# Patient Record
Sex: MALE | Race: BLACK OR AFRICAN AMERICAN | NOT HISPANIC OR LATINO | Employment: FULL TIME | URBAN - METROPOLITAN AREA
[De-identification: names, ages, dates, MRNs, and addresses within clinical notes are randomized per-mention and may not be internally consistent; named-entity substitution may affect disease eponyms.]

---

## 2017-06-07 ENCOUNTER — OFFICE VISIT (OUTPATIENT)
Dept: INTERNAL MEDICINE | Facility: CLINIC | Age: 32
End: 2017-06-07
Payer: COMMERCIAL

## 2017-06-07 VITALS
TEMPERATURE: 97 F | HEIGHT: 72 IN | HEART RATE: 92 BPM | BODY MASS INDEX: 26.82 KG/M2 | WEIGHT: 198 LBS | DIASTOLIC BLOOD PRESSURE: 82 MMHG | SYSTOLIC BLOOD PRESSURE: 132 MMHG

## 2017-06-07 DIAGNOSIS — J00 HEAD COLD: Primary | ICD-10-CM

## 2017-06-07 DIAGNOSIS — B30.9 ACUTE VIRAL CONJUNCTIVITIS OF BOTH EYES: ICD-10-CM

## 2017-06-07 PROCEDURE — 99203 OFFICE O/P NEW LOW 30 MIN: CPT | Mod: S$GLB,,, | Performed by: FAMILY MEDICINE

## 2017-06-07 PROCEDURE — 99999 PR PBB SHADOW E&M-NEW PATIENT-LVL III: CPT | Mod: PBBFAC,,, | Performed by: FAMILY MEDICINE

## 2017-06-07 RX ORDER — PREDNISONE 5 MG/1
TABLET ORAL
Qty: 18 TABLET | Refills: 0 | Status: SHIPPED | OUTPATIENT
Start: 2017-06-07 | End: 2017-12-20

## 2017-06-07 RX ORDER — CETIRIZINE HYDROCHLORIDE 10 MG/1
10 TABLET ORAL DAILY
COMMUNITY
End: 2017-12-20

## 2017-06-07 NOTE — PATIENT INSTRUCTIONS
Conjunctivitis, Viral    Viral conjunctivitis (sometimes called pink eye) is a common infection of the eye. It is very contagious. Touching the infected eye, then touching another person passes this infection. It can also be spread from one eye to the other in this same way. The most common symptoms include redness, discharge from the eye, swollen eyelids, and a gritty or scratchy feeling in the eye.  This condition will take about 7 to 10 days to go away. Artificial tears (available without a prescription) are often recommended to moisten and clean the eyes. Antibiotic eye drops often are not recommended because they will not kill the virus. But sometimes they may be prescribed by eye doctors. This is to prevent a second, bacterial infection.  Home care  · Apply a towel soaked in cool water to the affected eye 3 to 4 times a day (just before applying medicine to the eye).  · It is common to have mucus drainage during the night, causing the eyelids to become crusted by morning. Use a warm, wet cloth to wipe this away.  · Launder cloths used to clean the eye after one use. Do not reuse them.  · If antibiotic medicines are prescribed, take them exactly as directed. Do not stop taking them until you are told to.  · You may use acetaminophen or ibuprofen to control pain, unless another medicine was prescribed. (Note:If you have chronic liver or kidney disease, or if you have ever had a stomach ulcer or gastrointestinal bleeding, talk with your healthcare provider before using these medicines.) Aspirin should never be used in anyone under 18 years of age who is ill with a fever. It may cause severe liver damage.  · Wash your hands before and after touching the affected eye. This helps to prevent spreading the infection to your other eye and to others.  · The infected person should avoid sharing towels, washcloths, and bedding with others. This is to prevent spreading the infection.  · This illness is contagious during  the first week. Children with this illness should be kept out of day care and school until the redness clears.  Follow-up care  Follow up with your healthcare provider, or as advised.  When to seek medical advice  Call your healthcare provider right away if any of these occur:  · Worsening vision  · Increasing pain in the eye  · Increasing swelling or redness of the eyelid  · Redness spreading to the face around the eye  · Large amount of green or yellow drainage from the eye  · Severe itching in or around the eye  · Fever of 100.4°F (38°C) or higher  Date Last Reviewed: 6/15/2015  © 4185-1304 [employer]. [address] [place] [address], [place] [address]. All rights reserved. This information is not intended as a substitute for professional medical care. Always follow your healthcare professional's instructions.        Understanding the Cold Virus  Colds are the most common illness that people get. Most adults get 2 or 3 colds per year, and most children get 5 to 7 colds per year. Colds may be caused by over 200 types of viruses. The most common of these are rhinoviruses (rhino refers to the nose).  What causes a cold virus?  All colds start with infection by a virus. You can be infected by more than one cold virus at a time. Infection with cold viruses happens when:  · You breathe in a virus from the air. This can happen when someone with a cold sneezes or coughs near you.  · You touch your eyes, nose, or mouth when your hand has a cold virus on it. This can happen if you touch an object that has the cold virus on it.  What are the symptoms of a cold virus?  Almost all colds involve a stuffy nose. Other common symptoms include:  · Runny nose  · Sneezing  · Sore throat  · Headache  · Cough  How is a cold treated?  Colds usually last 5 to 10 days. Treatment focuses on relieving symptoms. Treatments may include:  · Decongestant medicines. Several types of decongestants are available without prescription.  These may help reduce stuffy or runny nose symptoms.  · Prescription or over-the-counter nasal sprays. These may help reduce nasal symptoms, including stuffiness.  · Prescription or over-the-counter pain medicines. These can help with headaches and sore throat.  · Self-care. This includes extra rest, using humidifiers, and drinking more fluids. These help you feel better while you are getting over a cold.  Antibiotics are not helpful for a cold. They do not make a cold shorter or relieve symptoms. Taking antibiotics when you dont need them can make them work less well when you need them for another illness.  Follow all directions for using medicines, especially when giving them to children. Contact your healthcare provider if you have any questions about using cold medicines safely.  Can a cold be prevented?  You can help reduce the spread of cold viruses. This can help both you and others avoid getting colds. Follow these tips:  · Wash your hands well anytime you may have come into contact with cold viruses. Wash your hands for at least 20 seconds. When you cant wash with soap and water, use an alcohol-based hand .  · Dont touch your nose, eyes, or mouth, especially after touching something that may have a cold virus on it.  · Cover your mouth and nose when you cough or sneeze. Throw away tissues after using them.  · Disinfect things you touch often, such as phones and keyboards.    · Stay home when you have a cold.  What are the possible complications of a cold virus?  Colds usually go away by themselves. But its not unusual to get another type of infection while you have a cold. These can include:  · Sinus infection  · Lung infection, such as bronchitis or pneumonia  · Ear infection  If you have asthma or chronic bronchitis, a cold can make your condition worse.     When should I call my healthcare provider?  Call your healthcare provider right away if you have any of these:  · Fever of 100.4°F  (38°C) or higher, or as directed  · Cough, chest pain, or shortness of breath that gets worse  · Symptoms dont get better or get worse after about 10 days  · Headache, sleepiness, or confusion that gets worse   Date Last Reviewed: 3/28/2016  © 8565-6486 [employer]. [address] [place] [address]. All rights reserved. This information is not intended as a substitute for professional medical care. Always follow your healthcare professional's instructions.

## 2017-06-07 NOTE — PROGRESS NOTES
"NOTE: Documentation entered by me for this encounter was done in part using speech-recognition technology. Although I have made an effort to ensure accuracy, malapropisms may exist and should be interpreted in context.  -MANUEL Juan MD    Patient ID: Anish Castillo is a 31 y.o. male.    CHIEF COMPLAINT   Sore Throat; Nasal Congestion; and Conjunctivitis    CURRENT MEDICATION LIST REVIEWED AND UPDATED:     Current Outpatient Prescriptions:     cetirizine (ZYRTEC) 10 MG tablet, Take 10 mg by mouth once daily., Disp: , Rfl:     predniSONE (DELTASONE) 5 MG tablet, Take 4 tablets by mouth once daily for 2 days, then 3 tabs daily for 2 days, then 2 tabs daily for 2 days, then 1 tab daily for 2 days, Disp: 18 tablet, Rfl: 0    MEDICAL HISTORY REVIEWED AND UPDATED:   He  has no past medical history on file.    SURGICAL HISTORY REVIEWED AND UPDATED:   He  has a past surgical history that includes ORIF wrist fracture (Right, 2001).    SOCIAL HISTORY REVIEWED AND UPDATED:   He  reports that he quit smoking about 2 months ago. He does not have any smokeless tobacco history on file. He reports that he drinks alcohol. He reports that he does not use drugs.    HISTORY OF PRESENT ILLNESS:      QUALITY described as "feeling like allergies.qu ot; LOCATION is bilateral eyes and in the distribution of the bilateral ethmoid and maxillary sinuses. ASSOCIATED SYMPTOMS include coryza, very MILD nonproductive cough, bilateral conjunctival injection, and "itchy" and "watery" eyes. ONSET was about four days ago. Symptoms occur in the CONTEXT of recent exposure to someone with near identical symptoms. SEVERITY of symptoms described as MODERATELY SEVERE. He reports  no change in vision, fever, sore throat, ear pain, chest pain, hemoptysis, or trouble breathing. I educated him on the apparently viral nature of this acute illness, how the management was largely supportive and symptom focused. We discussed the risks and benefits of " treatment options, and he feels that the SEVERITY of his symptoms is sufficient to warrant the therapy I am prescribing. I encouraged him to rest and drink ample fluids. I  told him to expect gradual resolution of symptoms over the next week or so, and I instructed him to let me know if his illness failed to follow this expected course.    No other complaints or concerns reported.     REVIEW OF SYSTEMS:   CONSTITUTIONAL: No fever reported.  CARDIOVASCULAR: No chest pain reported.  PULMONARY: No trouble breathing reported.  PSYCHIATRIC: No mood instability reported.     PHYSICAL EXAM:   CONST: Vital signs (BP, P, T, RR, et al) noted. No apparent distress. Does not appear acutely ill or septic. Appears adequately hydrated.  EYES: Pupils equal and reactive. Extraocular movements intact. Sclerae anicteric. Lids are unremarkable. Conjunctivae are mildly injected.  ENT: External ENT grossly unremarkable. Ear canals clear. Tympanic membranes are unremarkable, intact and not inflamed or bulging. Hearing grossly intact. Nasal mucosa pink-blue and boggy. Sinuses nontender to percussion. Oropharynx moist without lesion, inflammation or exudate. Posterior oropharynx is symmetric.  NECK: Trachea midline. No significant cervical lymphadenopathy.  PULM: Lungs clear. Breathing unlabored.  CV: Heart regular. No jugular venous distention. No carotid bruit.  GI: Abdomen soft and nontender. Bowel sounds present.  DERM: Skin warm and moist with normal turgor.  NEURO: Strength is reasonably symmetric without gross focal motor deficits or gross deficits of cranial nerves III-XII.  PSYCH: Alert and oriented x 3. Mood is grossly euthymic. Affect appropriate. Judgment and insight not grossly compromised.  MSK: Grossly normal stance and gait.     ASSESSMENT:     1. Head cold    2. Acute viral conjunctivitis of both eyes        PLAN:   NOTE: Unless specified otherwise, the PLAN for a listed ASSESSMENT is to continue present  "treatment as prescribed. The planned follow-up and additional "Patient Instructions" may also be found in the After Visit Summary printed and provided to the patient.    Head cold  -     predniSONE (DELTASONE) 5 MG tablet; Take 4 tablets by mouth once daily for 2 days, then 3 tabs daily for 2 days, then 2 tabs daily for 2 days, then 1 tab daily for 2 days  Dispense: 18 tablet; Refill: 0    Acute viral conjunctivitis of both eyes        There are no discontinued medications.     "

## 2017-12-20 ENCOUNTER — OFFICE VISIT (OUTPATIENT)
Dept: FAMILY MEDICINE | Facility: CLINIC | Age: 32
End: 2017-12-20
Payer: COMMERCIAL

## 2017-12-20 VITALS
WEIGHT: 201.06 LBS | SYSTOLIC BLOOD PRESSURE: 106 MMHG | HEART RATE: 103 BPM | HEIGHT: 72 IN | OXYGEN SATURATION: 98 % | BODY MASS INDEX: 27.23 KG/M2 | DIASTOLIC BLOOD PRESSURE: 74 MMHG | RESPIRATION RATE: 17 BRPM | TEMPERATURE: 96 F

## 2017-12-20 DIAGNOSIS — L02.429: Primary | ICD-10-CM

## 2017-12-20 PROBLEM — J00 HEAD COLD: Status: RESOLVED | Noted: 2017-06-07 | Resolved: 2017-12-20

## 2017-12-20 PROBLEM — B30.9 ACUTE VIRAL CONJUNCTIVITIS OF BOTH EYES: Status: RESOLVED | Noted: 2017-06-07 | Resolved: 2017-12-20

## 2017-12-20 PROCEDURE — 99213 OFFICE O/P EST LOW 20 MIN: CPT | Mod: 25,S$GLB,, | Performed by: REGISTERED NURSE

## 2017-12-20 PROCEDURE — 96372 THER/PROPH/DIAG INJ SC/IM: CPT | Mod: S$GLB,,, | Performed by: FAMILY MEDICINE

## 2017-12-20 PROCEDURE — 99999 PR PBB SHADOW E&M-EST. PATIENT-LVL III: CPT | Mod: PBBFAC,,, | Performed by: REGISTERED NURSE

## 2017-12-20 RX ORDER — CEFAZOLIN SODIUM 1 G/3ML
1 INJECTION, POWDER, FOR SOLUTION INTRAMUSCULAR; INTRAVENOUS
Status: COMPLETED | OUTPATIENT
Start: 2017-12-20 | End: 2017-12-20

## 2017-12-20 RX ORDER — MUPIROCIN 20 MG/G
OINTMENT TOPICAL 3 TIMES DAILY
Qty: 30 G | Refills: 0 | Status: SHIPPED | OUTPATIENT
Start: 2017-12-20 | End: 2017-12-30

## 2017-12-20 RX ORDER — SULFAMETHOXAZOLE AND TRIMETHOPRIM 800; 160 MG/1; MG/1
1 TABLET ORAL 2 TIMES DAILY
Qty: 20 TABLET | Refills: 0 | Status: SHIPPED | OUTPATIENT
Start: 2017-12-20 | End: 2017-12-30

## 2017-12-20 RX ADMIN — CEFAZOLIN SODIUM 1 G: 1 INJECTION, POWDER, FOR SOLUTION INTRAMUSCULAR; INTRAVENOUS at 08:12

## 2017-12-20 NOTE — PATIENT INSTRUCTIONS
Abscess (Antibiotic Treatment Only)  An abscess (sometimes called a boil) happens when bacteria get trapped under the skin and start to grow. Pus forms inside the abscess as the body responds to the bacteria. An abscess can happen with an insect bite, ingrown hair, blocked oil gland, pimple, cyst, or puncture wound.  In the early stages, your wound may be red and tender. For this stage, you may get antibiotics. If the abscess does not get better with antibiotics, it will need to be drained with a small cut.  Home care  These tips will help you care for your abscess at home:  · Soak the wound in hot water or apply hot packs (small towel soaked in hot water) to the area for 20 minutes at a time. Do this 3 to 4 times a day.  · Do not cut, squeeze, or pop the boil yourself.  · Apply antibiotic cream or ointment to the skin 3 to 4 times a day, unless something else was prescribed. Some ointments include an antibiotic plus a pain reliever.  · If your doctor prescribed antibiotics, do not stop taking them until you have finished the medicine or the doctor tells you to stop.  · You may use an over-the-counter pain medicine to control pain, unless another pain medicine was prescribed. If you have chronic liver or kidney disease or ever had a stomach ulcer or gastrointestinal bleeding, talk with your doctor before using these any of these.  Follow-up care  Follow up with your healthcare provider, or as advised. Check your wound each day for the signs of worsening infection listed below.  When to seek medical advice  Get prompt medical attention if any of these occur:  · An increase in redness or swelling  · Red streaks in the skin leading away from the abscess  · An increase in local pain or swelling  · Fever of 100.4ºF (38ºC) or higher, or as directed by your healthcare provider  · Pus or fluid coming from the abscess  · Boil returns after getting better  Date Last Reviewed: 9/1/2016  © 4314-9779 [employer] [address] [place] [address]. All rights reserved. This information is not intended as a substitute for professional medical care. Always follow your healthcare professional's instructions.

## 2017-12-20 NOTE — PROGRESS NOTES
"Subjective:       Patient ID: Anish Castillo is a 32 y.o. male.    Chief Complaint: Possible boil      HPI    Yamil is here today with c/o sore to the RT hip x 5 days.  He reports using a sterile needle into skin lesion and drained thick pus and blood.  Still with soreness and redness to area.  He has been using heat to area with salve.  Denies fever or chills.      Review of Systems   Constitutional: Negative for chills and fever.   Skin: Positive for wound.         Patient Active Problem List   Diagnosis   None         Objective:     Vitals:    12/20/17 0808   BP: 106/74   Pulse: 103   Resp: 17   Temp: 96.4 °F (35.8 °C)   TempSrc: Tympanic   SpO2: 98%   Weight: 91.2 kg (201 lb 1 oz)   Height: 5' 11.5" (1.816 m)   PainSc:   5   PainLoc: Hip       Physical Exam   Constitutional: He is oriented to person, place, and time. He appears well-developed and well-nourished.   Afebrile   Neurological: He is alert and oriented to person, place, and time.   Skin:        Boil noted to RT lateral thigh --- no active drainage, mild TTP with erythema and induration, no fluctuance.   Vitals reviewed.        Medication List with Changes/Refills   New Medications    MUPIROCIN (BACTROBAN) 2 % OINTMENT    Apply topically 3 (three) times daily.    SULFAMETHOXAZOLE-TRIMETHOPRIM 800-160MG (BACTRIM DS) 800-160 MG TAB    Take 1 tablet by mouth 2 (two) times daily.   Discontinued Medications    CETIRIZINE (ZYRTEC) 10 MG TABLET    Take 10 mg by mouth once daily.    PREDNISONE (DELTASONE) 5 MG TABLET    Take 4 tablets by mouth once daily for 2 days, then 3 tabs daily for 2 days, then 2 tabs daily for 2 days, then 1 tab daily for 2 days           Diagnosis       1. Boil, hip          Assessment/ Plan     Boil, hip  -     ceFAZolin injection 1 g; Inject 1,000 mg (1 g total) into the muscle one time.  -     sulfamethoxazole-trimethoprim 800-160mg (BACTRIM DS) 800-160 mg Tab; Take 1 tablet by mouth 2 (two) times daily.  Dispense: 20 tablet; " Refill: 0  -     mupirocin (BACTROBAN) 2 % ointment; Apply topically 3 (three) times daily.  Dispense: 30 g; Refill: 0        Local warm compresses TID to QID as directed.  Infection precautions.  Follow-up in 2 to 3 days if worse or no improvement seen.          ABRIL Cohen  [facility]

## 2020-04-13 ENCOUNTER — TELEPHONE (OUTPATIENT)
Dept: INTERNAL MEDICINE | Facility: CLINIC | Age: 35
End: 2020-04-13

## 2021-04-29 ENCOUNTER — PATIENT MESSAGE (OUTPATIENT)
Dept: RESEARCH | Facility: HOSPITAL | Age: 36
End: 2021-04-29